# Patient Record
(demographics unavailable — no encounter records)

---

## 2024-10-10 NOTE — HISTORY OF PRESENT ILLNESS
[FreeTextEntry1] : This is a case of a 30yr righthanded male with no significant  PMH of  presents today with headaches.  Pt has had a history of headaches since 5 yrs. Worsening HA that occur 1-3 times a week.  Headaches can last all day. Located frontal.  Described as pressure and throbbing.  Denies n/v.  Pt did have an episode of dizziness/lightheadedness which he has not had in the past.   Denies  phonophobia. Does have photophobia.  Denies visual changes, blurred, doubled.  Denies extremity weakness or slurred.  Denies numbness or tingling.  Treating with Excedrin migraine with relief most of the time.  Also needs to lay down in dark room if headache worsens Triggers: unsure of triggers   Occupation: guidance counselor sleep:  broken sleep  Allergies: NKA

## 2024-10-10 NOTE — ASSESSMENT
[FreeTextEntry1] : This is a case of a 30yr righthanded male with no significant  PMH of  presents today with headaches occurring 1-3 times a week, worsening.  Symptoms: photophobia, dizziness Trial rizatriptan 10 mg MRI to r/o structural pathology= worsening HA and dizziness  Plan:  {\\\} MRI brain  {\\\} rizatriptan 10 mg  {\\\} Follow up 3 months   Headache education provided: [] Stay well hydrated [] Limit excessive caffeine and alcohol intake [] Maintain good sleep hygiene [] Try to avoid triggers [] Practice good eating habits [] Avoid excessive use of over the counter pain medications, as they can cause medication overuse headaches  [] Keep a headache diary [] Relaxation techniques, biofeedback, massage therapy, acupunctures, and heating pads may be effective   The above plan was discussed with Jero Schreiber in great detail. Jero Schreiber verbalized understanding and agrees with plan as detailed above. Patient was provided education and counselling on current diagnosis/symptoms. She was advised to call our clinic at [phone] for any new or worsening symptoms, or with any questions or concerns. In case of acute onset of neurological symptoms or worsening presentation, patient was advised to present to nearest emergency room for further evaluation. Jero Schreiber expressed understanding and all her questions/concerns were addressed.

## 2024-10-10 NOTE — PHYSICAL EXAM
[Neck Appearance] : the appearance of the neck was normal [] : no respiratory distress [Skin Color & Pigmentation] : normal skin color and pigmentation [General Appearance - Alert] : alert [General Appearance - Well Nourished] : well nourished [Oriented To Time, Place, And Person] : oriented to person, place, and time [Affect] : the affect was normal [Person] : oriented to person [Place] : oriented to place [Cranial Nerves Oculomotor (III)] : extraocular motion intact [Cranial Nerves Facial (VII)] : face symmetrical [Cranial Nerves Accessory (XI - Cranial And Spinal)] : head turning and shoulder shrug symmetric [Motor Tone] : muscle tone was normal in all four extremities [Motor Strength] : muscle strength was normal in all four extremities [Abnormal Walk] : normal gait [Balance] : balance was intact [2+] : Patella left 2+

## 2025-01-13 NOTE — PHYSICAL EXAM
[General Appearance - Alert] : alert [General Appearance - Well Nourished] : well nourished [Oriented To Time, Place, And Person] : oriented to person, place, and time [Affect] : the affect was normal [Person] : oriented to person [Place] : oriented to place [Time] : oriented to time [Cranial Nerves Oculomotor (III)] : extraocular motion intact [Cranial Nerves Facial (VII)] : face symmetrical [Cranial Nerves Accessory (XI - Cranial And Spinal)] : head turning and shoulder shrug symmetric [Motor Tone] : muscle tone was normal in all four extremities [Motor Strength] : muscle strength was normal in all four extremities [Balance] : balance was intact [] : no respiratory distress [Abnormal Walk] : normal gait [Skin Color & Pigmentation] : normal skin color and pigmentation

## 2025-01-13 NOTE — HISTORY OF PRESENT ILLNESS
[FreeTextEntry1] : This is a case of a 30yr righthanded male with no significant PMH of presents today for a follow up.   Pt is here for a follow up. Headaches have improved.  They have been less frequent, now about 3 a month.  He has been treating with rizatriptan which he is having relief from.  He has repeated doses on occasion.  Denies any new neurological concerns.   Interval Oct 10, 2024 Pt has had a history of headaches since 5 yrs. Worsening HA that occur 1-3 times a week. Headaches can last all day. Located frontal. Described as pressure and throbbing. Denies n/v. Pt did have an episode of dizziness/lightheadedness which he has not had in the past. Denies phonophobia. Does have photophobia. Denies visual changes, blurred, doubled. Denies extremity weakness or slurred. Denies numbness or tingling. Treating with Excedrin migraine with relief most of the time. Also needs to lay down in dark room if headache worsens Triggers: unsure of triggers   Occupation: guidance counselor sleep: broken sleep Allergies: NKA

## 2025-01-13 NOTE — ASSESSMENT
[FreeTextEntry1] : This is a case of a 30yr righthanded male with no significant PMH of presents today for a follow up.   Plan:  {   \} continue rizatriptan 10 mg  {   \} Follow up 6 months   Headache education provided: [] Stay well hydrated [] Limit excessive caffeine and alcohol intake [] Maintain good sleep hygiene [] Try to avoid triggers [] Practice good eating habits [] Avoid excessive use of over the counter pain medications, as they can cause medication overuse headaches [] Keep a headache diary [] Relaxation techniques, biofeedback, massage therapy, acupunctures, and heating pads may be effective   The above plan was discussed with Jero Schreiber in great detail. Jero Schreiber verbalized understanding and agrees with plan as detailed above. Patient was provided education and counselling on current diagnosis/symptoms. She was advised to call our clinic at [phone] for any new or worsening symptoms, or with any questions or concerns. In case of acute onset of neurological symptoms or worsening presentation, patient was advised to present to nearest emergency room for further evaluation. Jero Schreiber expressed understanding and all her questions/concerns were addressed.

## 2025-07-14 NOTE — ASSESSMENT
[FreeTextEntry1] : This is a case of a 30yr righthanded male with no significant PMH of presents today for a follow up.  Plan:  { \} continue rizatriptan 10 mg  { \} Follow up 1 yr   Headache education provided: [] Stay well hydrated [] Limit excessive caffeine and alcohol intake [] Maintain good sleep hygiene [] Try to avoid triggers [] Practice good eating habits [] Avoid excessive use of over the counter pain medications, as they can cause medication overuse headaches [] Keep a headache diary [] Relaxation techniques, biofeedback, massage therapy, acupunctures, and heating pads may be effective   The above plan was discussed with Jero Schreiber in great detail. Jero Schreiber verbalized understanding and agrees with plan as detailed above. Patient was provided education and counselling on current diagnosis/symptoms. She was advised to call our clinic at [phone] for any new or worsening symptoms, or with any questions or concerns. In case of acute onset of neurological symptoms or worsening presentation, patient was advised to present to nearest emergency room for further evaluation. Jero Schreiber expressed understanding and all her questions/concerns were addressed.

## 2025-07-14 NOTE — HISTORY OF PRESENT ILLNESS
[FreeTextEntry1] : This is a case of a 30yr righthanded male with no significant PMH of presents today for a follow up.  Pt is here for a follow up.  Pt doing very well today.  No new headaches symptoms.  Using rizatriptan with relief.  He is excited to be going on a cruise to [place] next month.  Denies any worsening symptoms.   Interval Jan 13, 2025 Pt is here for a follow up. Headaches have improved. They have been less frequent, now about 3 a month. He has been treating with rizatriptan which he is having relief from. He has repeated doses on occasion. Denies any new neurological concerns.  Interval Oct 10, 2024 Pt has had a history of headaches since 5 yrs. Worsening HA that occur 1-3 times a week. Headaches can last all day. Located frontal. Described as pressure and throbbing. Denies n/v. Pt did have an episode of dizziness/lightheadedness which he has not had in the past. Denies phonophobia. Does have photophobia. Denies visual changes, blurred, doubled. Denies extremity weakness or slurred. Denies numbness or tingling. Treating with Excedrin migraine with relief most of the time. Also needs to lay down in dark room if headache worsens Triggers: unsure of triggers   Occupation: guidance counselor sleep: broken sleep Allergies: NKA

## 2025-07-14 NOTE — PHYSICAL EXAM
[General Appearance - Alert] : alert [General Appearance - Well Nourished] : well nourished [Oriented To Time, Place, And Person] : oriented to person, place, and time [Affect] : the affect was normal [Person] : oriented to person [Place] : oriented to place [Neck Appearance] : the appearance of the neck was normal [] : no respiratory distress [Abnormal Walk] : normal gait [Skin Color & Pigmentation] : normal skin color and pigmentation